# Patient Record
Sex: MALE | Race: WHITE
[De-identification: names, ages, dates, MRNs, and addresses within clinical notes are randomized per-mention and may not be internally consistent; named-entity substitution may affect disease eponyms.]

---

## 2019-04-14 ENCOUNTER — HOSPITAL ENCOUNTER (EMERGENCY)
Dept: HOSPITAL 94 - ER | Age: 74
Discharge: HOME | End: 2019-04-14
Payer: MEDICARE

## 2019-04-14 VITALS — BODY MASS INDEX: 23.39 KG/M2 | WEIGHT: 154.32 LBS | HEIGHT: 68 IN

## 2019-04-14 VITALS — DIASTOLIC BLOOD PRESSURE: 79 MMHG | SYSTOLIC BLOOD PRESSURE: 118 MMHG

## 2019-04-14 DIAGNOSIS — Z79.899: ICD-10-CM

## 2019-04-14 DIAGNOSIS — Z87.891: ICD-10-CM

## 2019-04-14 DIAGNOSIS — R31.0: ICD-10-CM

## 2019-04-14 DIAGNOSIS — R33.9: Primary | ICD-10-CM

## 2019-04-14 LAB
BACTERIA URNS QL MICRO: (no result) /HPF
CLARITY UR: (no result)
COLOR UR: (no result)
DEPRECATED SQUAMOUS URNS QL MICRO: (no result) /LPF
GLUCOSE UR STRIP-MCNC: (no result) MG/DL
HGB UR QL STRIP: (no result)
KETONES UR STRIP-MCNC: (no result) MG/DL
LEUKOCYTE ESTERASE UR QL STRIP: (no result)
NITRITE UR QL STRIP: (no result)
PH UR STRIP: (no result) [PH] (ref 4.8–8)
PROT UR QL STRIP: (no result) MG/DL
RBC #/AREA URNS HPF: (no result) /HPF (ref 0–2)
SP GR UR STRIP: (no result) (ref 1–1.03)
URN COLLECT METHOD CLASS: (no result)
UROBILINOGEN UR STRIP-MCNC: (no result) E.U/DL (ref 0.2–1)
WBC #/AREA URNS HPF: (no result) /HPF (ref 0–4)

## 2019-04-14 PROCEDURE — 81001 URINALYSIS AUTO W/SCOPE: CPT

## 2019-04-14 PROCEDURE — 51700 IRRIGATION OF BLADDER: CPT

## 2019-04-14 PROCEDURE — 99284 EMERGENCY DEPT VISIT MOD MDM: CPT

## 2019-04-14 NOTE — NUR
Pts catheter was irrigated with approx 750ml NS till urine clear noting having 
only one small clot. Pt was then hooked up to leg bag and now urine is draining 
urine with blood. MD Fragoso updated and informed of findings